# Patient Record
Sex: MALE | Race: OTHER
[De-identification: names, ages, dates, MRNs, and addresses within clinical notes are randomized per-mention and may not be internally consistent; named-entity substitution may affect disease eponyms.]

---

## 2017-07-27 ENCOUNTER — HOSPITAL ENCOUNTER (EMERGENCY)
Dept: HOSPITAL 61 - ER | Age: 32
Discharge: HOME | End: 2017-07-27
Payer: SELF-PAY

## 2017-07-27 VITALS — BODY MASS INDEX: 26.66 KG/M2 | WEIGHT: 160 LBS | HEIGHT: 65 IN

## 2017-07-27 VITALS — SYSTOLIC BLOOD PRESSURE: 141 MMHG | DIASTOLIC BLOOD PRESSURE: 88 MMHG

## 2017-07-27 DIAGNOSIS — N45.1: Primary | ICD-10-CM

## 2017-07-27 LAB
BACTERIA #/AREA URNS HPF: 0 /HPF
BILIRUB UR QL STRIP: NEGATIVE
GLUCOSE UR STRIP-MCNC: NEGATIVE MG/DL
NITRITE UR QL STRIP: NEGATIVE
PH UR STRIP: 7 [PH]
PROT UR STRIP-MCNC: NEGATIVE MG/DL
RBC #/AREA URNS HPF: (no result) /HPF (ref 0–2)
SP GR UR STRIP: 1.02
UROBILINOGEN UR-MCNC: 1 MG/DL
WBC #/AREA URNS HPF: (no result) /HPF (ref 0–4)

## 2017-07-27 PROCEDURE — 76870 US EXAM SCROTUM: CPT

## 2017-07-27 PROCEDURE — 96372 THER/PROPH/DIAG INJ SC/IM: CPT

## 2017-07-27 PROCEDURE — 87086 URINE CULTURE/COLONY COUNT: CPT

## 2017-07-27 PROCEDURE — 81001 URINALYSIS AUTO W/SCOPE: CPT

## 2017-07-27 PROCEDURE — 87591 N.GONORRHOEAE DNA AMP PROB: CPT

## 2017-07-27 PROCEDURE — 87491 CHLMYD TRACH DNA AMP PROBE: CPT

## 2017-07-27 PROCEDURE — 99285 EMERGENCY DEPT VISIT HI MDM: CPT

## 2017-07-27 NOTE — RAD
EXAM: Testicular sonogram.

 

HISTORY: Right-sided pain.

 

TECHNIQUE: Grayscale and color Doppler sonographic imaging of the testes 

with spectral waveform analysis was performed.

 

COMPARISON: None.

 

FINDINGS: The right testis measures 4.6 x 3.3 x 2.6 cm. The left testis 

measures 5.0 x 2.8 x 2.7 cm. There is normal symmetric blood flow within 

both testes. There is a heterogeneously enlarged and hyperemic right 

epididymis. There are small right greater than left hydroceles.

 

IMPRESSION:

1. Heterogeneously enlarged and hyperemic right epididymis likely due to 

epididymitis.

2. Sonographically unremarkable testes.

3. Small right greater than left hydroceles.

 

Electronically signed by: Kendra Rodriguez MD (7/27/2017 7:27 PM) [facility]

## 2017-12-21 ENCOUNTER — HOSPITAL ENCOUNTER (EMERGENCY)
Dept: HOSPITAL 61 - ER | Age: 32
Discharge: HOME | End: 2017-12-21
Payer: SELF-PAY

## 2017-12-21 VITALS — WEIGHT: 160 LBS | BODY MASS INDEX: 25.71 KG/M2 | HEIGHT: 66 IN

## 2017-12-21 VITALS — SYSTOLIC BLOOD PRESSURE: 127 MMHG | DIASTOLIC BLOOD PRESSURE: 75 MMHG

## 2017-12-21 DIAGNOSIS — N20.0: Primary | ICD-10-CM

## 2017-12-21 DIAGNOSIS — K46.9: ICD-10-CM

## 2017-12-21 LAB
ALBUMIN SERPL-MCNC: 4.1 G/DL (ref 3.4–5)
ALBUMIN/GLOB SERPL: 1.1 {RATIO} (ref 1–1.7)
ALP SERPL-CCNC: 92 U/L (ref 46–116)
ALT SERPL-CCNC: 29 U/L (ref 16–63)
ANION GAP SERPL CALC-SCNC: 10 MMOL/L (ref 6–14)
AST SERPL-CCNC: 23 U/L (ref 15–37)
BACTERIA #/AREA URNS HPF: (no result) /HPF
BASOPHILS # BLD AUTO: 0.1 X10^3/UL (ref 0–0.2)
BASOPHILS NFR BLD: 1 % (ref 0–3)
BILIRUB SERPL-MCNC: 0.3 MG/DL (ref 0.2–1)
BILIRUB UR QL STRIP: NEGATIVE
BUN SERPL-MCNC: 17 MG/DL (ref 8–26)
BUN/CREAT SERPL: 19 (ref 6–20)
CALCIUM SERPL-MCNC: 8.6 MG/DL (ref 8.5–10.1)
CHLORIDE SERPL-SCNC: 104 MMOL/L (ref 98–107)
CO2 SERPL-SCNC: 27 MMOL/L (ref 21–32)
CREAT SERPL-MCNC: 0.9 MG/DL (ref 0.7–1.3)
EOSINOPHIL NFR BLD: 1 % (ref 0–3)
ERYTHROCYTE [DISTWIDTH] IN BLOOD BY AUTOMATED COUNT: 13.6 % (ref 11.5–14.5)
GFR SERPLBLD BASED ON 1.73 SQ M-ARVRAT: 99.1 ML/MIN
GLOBULIN SER-MCNC: 3.6 G/DL (ref 2.2–3.8)
GLUCOSE SERPL-MCNC: 147 MG/DL (ref 70–99)
GLUCOSE UR STRIP-MCNC: NEGATIVE MG/DL
HCT VFR BLD CALC: 45 % (ref 39–53)
HGB BLD-MCNC: 15.1 G/DL (ref 13–17.5)
LYMPHOCYTES # BLD: 3.6 X10^3/UL (ref 1–4.8)
LYMPHOCYTES NFR BLD AUTO: 39 % (ref 24–48)
MCH RBC QN AUTO: 29 PG (ref 25–35)
MCHC RBC AUTO-ENTMCNC: 34 G/DL (ref 31–37)
MCV RBC AUTO: 85 FL (ref 79–100)
MONOCYTES NFR BLD: 6 % (ref 0–9)
NEUTROPHILS NFR BLD AUTO: 53 % (ref 31–73)
NITRITE UR QL STRIP: NEGATIVE
PH UR STRIP: 7 [PH]
PLATELET # BLD AUTO: 354 X10^3/UL (ref 140–400)
POTASSIUM SERPL-SCNC: 3.8 MMOL/L (ref 3.5–5.1)
PROT SERPL-MCNC: 7.7 G/DL (ref 6.4–8.2)
PROT UR STRIP-MCNC: NEGATIVE MG/DL
RBC # BLD AUTO: 5.27 X10^6/UL (ref 4.3–5.7)
RBC #/AREA URNS HPF: >40 /HPF (ref 0–2)
SODIUM SERPL-SCNC: 141 MMOL/L (ref 136–145)
SP GR UR STRIP: 1.02
SQUAMOUS #/AREA URNS LPF: (no result) /LPF
UROBILINOGEN UR-MCNC: 1 MG/DL
WBC # BLD AUTO: 9.2 X10^3/UL (ref 4–11)
WBC #/AREA URNS HPF: (no result) /HPF (ref 0–4)

## 2017-12-21 PROCEDURE — 83690 ASSAY OF LIPASE: CPT

## 2017-12-21 PROCEDURE — 96361 HYDRATE IV INFUSION ADD-ON: CPT

## 2017-12-21 PROCEDURE — 36415 COLL VENOUS BLD VENIPUNCTURE: CPT

## 2017-12-21 PROCEDURE — 96375 TX/PRO/DX INJ NEW DRUG ADDON: CPT

## 2017-12-21 PROCEDURE — 74177 CT ABD & PELVIS W/CONTRAST: CPT

## 2017-12-21 PROCEDURE — 96374 THER/PROPH/DIAG INJ IV PUSH: CPT

## 2017-12-21 PROCEDURE — 81001 URINALYSIS AUTO W/SCOPE: CPT

## 2017-12-21 PROCEDURE — 85025 COMPLETE CBC W/AUTO DIFF WBC: CPT

## 2017-12-21 PROCEDURE — 80053 COMPREHEN METABOLIC PANEL: CPT

## 2017-12-21 PROCEDURE — 99285 EMERGENCY DEPT VISIT HI MDM: CPT

## 2017-12-21 RX ADMIN — HYDROMORPHONE HYDROCHLORIDE PRN MG: 2 INJECTION INTRAMUSCULAR; INTRAVENOUS; SUBCUTANEOUS at 08:59

## 2017-12-21 RX ADMIN — HYDROMORPHONE HYDROCHLORIDE PRN MG: 2 INJECTION INTRAMUSCULAR; INTRAVENOUS; SUBCUTANEOUS at 09:21

## 2017-12-21 NOTE — RAD
Indication: Abdominal pain.



Axial imaging through the abdomen and pelvis was performed after the

administration of intravenous contrast.



No prior studies are available for comparison.



The lung bases are clear. No discrete liver mass is identified. The

gallbladder is unremarkable. The pancreas and spleen are unremarkable. No

adrenal mass is identified. No renal calculi are seen. There does appear to be

moderate right-sided hydroureteronephrosis. There is a questionable tiny

distal right ureteric calculus near the UVJ versus a phlebolith. The left

ureter is unremarkable. The small and large bowel loops are normal caliber.

There is no ascites. Bladder is unremarkable. There is a right inguinal hernia

containing fluid.



Impression:

1. Right-sided hydroureteronephrosis. There is questionable tiny distal right

ureteric calculus versus a phlebolith. Findings may be secondary to recent

passage of a calculus with residual edema at the UVJ.

2. Right inguinal hernia.

## 2017-12-21 NOTE — PHYS DOC
Past Medical History


Past Medical History:  No Pertinent History


Past Surgical History:  No Surgical History


Alcohol Use:  Occasionally


Drug Use:  None





Adult General


Chief Complaint


Chief Complaint:  ABDOMINAL PAIN





HPI


HPI





32-year-old male presenting to the emergency department today with right lower 

quadrant abdominal pain/flank pain. It is a sharp shooting pain that radiates 

into the groin. It is severe. He denies any fevers or chills. He has nausea 

without any vomiting. He denies having any history of medical conditions.





Review of systems is negative for chest pain shortness of breath fevers chills 

vomiting diarrhea. All other review of systems is negative unless otherwise 

noted in history of present illness.





ED course: 32-year-old male presenting to the emergency department today with 

right-sided flank pain/right lower quadrant abdominal pain. On arrival he is 

afebrile with a normal heart rate. Physical exam shows right-sided flank pain. 

he is a soft nontender abdomen. Blood work and CT obtained. CBC unremarkable. 

Urinalysis shows blood in the urine. Chemistry panel unremarkable. The patient 

was given IV fluids nausea and pain medications in the emergency department. CT 

abdomen pelvis shows a hernia and a probably recently expelled stone versus 

tiny stone at the UVJ. On reexamination the patient he continues to have a soft 

nontender abdomen. Negative McBurney's point. His hernia is easily reducible 

and obviously not strangulated or incarcerated. His pain is not in his hernia 

site. He is feeling much better on examination. The patient was then discharged 

home in stable condition to follow up with their primary care physician over 

the next 2-3 days. They were to return if their symptoms worsened or if they 

were concerned for any reason. Face-to-face discharge instructions and return 

precautions were given. Patient's questions were answered to their 

satisfaction. Patient is comfortable plan.





Review of Systems


Review of Systems


SEE ABOVE.





Current Medications


Current Medications





Current Medications








 Medications


  (Trade)  Dose


 Ordered  Sig/Travis  Start Time


 Stop Time Status Last Admin


Dose Admin


 


 Hydromorphone HCl


  (Dilaudid)  0.5 mg  PRN Q15MIN  PRN  12/21/17 09:00


 12/22/17 08:59  12/21/17 09:21


0.5 MG


 


 Info


  (Do NOT chart on


 this entry -- for


 MONITORING)  1 each  PRN DAILY  PRN  12/21/17 10:00


 12/23/17 09:59   


 


 


 Iohexol


  (Omnipaque 300


 Mg/ml)  75 ml  1X  ONCE  12/21/17 09:45


 12/21/17 09:47 DC 12/21/17 10:28


75 ML


 


 Ondansetron HCl


  (Zofran)  4 mg  1X  ONCE  12/21/17 09:00


 12/21/17 09:01 DC 12/21/17 08:57


4 MG


 


 Sodium Chloride  1,000 ml @ 


 1,000 mls/hr  Q1H  12/21/17 08:48


 12/21/17 09:47 DC 12/21/17 08:56


1,000 MLS/HR











Allergies


Allergies





Allergies








Coded Allergies Type Severity Reaction Last Updated Verified


 


  No Known Drug Allergies    7/27/17 No











Physical Exam


Physical Exam


SEE ABOVE





Constitutional: Well developed, well nourished, no acute distress, non-toxic 

appearance. 


HENT: Normocephalic, atraumatic, bilateral external ears normal, oropharynx 

moist, no oral exudates, nose normal. []


Eyes: PERRLA, EOMI, conjunctiva normal, no discharge. [] 


Neck: Normal range of motion, no tenderness, supple, no stridor.  


Cardiovascular:Heart rate regular rhythm, no murmur []


Lungs & Thorax:  Bilateral breath sounds clear to auscultation []


Abdomen: SEE ABOVE


Skin: Warm, dry, no erythema, no rash.  


Back: right cva tenderness


Extremities: No tenderness, no cyanosis, no clubbing, ROM intact, no edema. [] 


Neurologic: Alert and oriented X 3, normal motor function, normal sensory 

function, no focal deficits noted. 


Psychologic: Affect normal, judgement normal, mood normal. []





Current Patient Data


Vital Signs





 Vital Signs








  Date Time  Temp Pulse Resp B/P (MAP) Pulse Ox O2 Delivery O2 Flow Rate FiO2


 


12/21/17 11:05  70 20 127/75 (92) 96   


 


12/21/17 08:59      Room Air  


 


12/21/17 08:53 97.5       





 97.5       








Lab Values





 Laboratory Tests








Test


  12/21/17


08:40 12/21/17


08:50


 


Urine Collection Type Unknown   


 


Urine Color Yellow   


 


Urine Clarity Clear   


 


Urine pH 7.0   


 


Urine Specific Gravity 1.025   


 


Urine Protein


  Negative mg/dL


(NEG-TRACE) 


 


 


Urine Glucose (UA)


  Negative mg/dL


(NEG) 


 


 


Urine Ketones (Stick)


  Negative mg/dL


(NEG) 


 


 


Urine Blood Large (NEG)   


 


Urine Nitrite


  Negative (NEG)


  


 


 


Urine Bilirubin


  Negative (NEG)


  


 


 


Urine Urobilinogen Dipstick


  1.0 mg/dL (0.2


mg/dL) 


 


 


Urine Leukocyte Esterase


  Negative (NEG)


  


 


 


Urine RBC


  >40 /HPF (0-2)


  


 


 


Urine WBC


  Occ /HPF (0-4)


  


 


 


Urine Squamous Epithelial


Cells Few /LPF  


  


 


 


Urine Bacteria


  Few /HPF


(0-FEW) 


 


 


Urine Hyaline Casts


  Occasional


/HPF 


 


 


Urine Mucus Mod /LPF   


 


White Blood Count


  


  9.2 x10^3/uL


(4.0-11.0)


 


Red Blood Count


  


  5.27 x10^6/uL


(4.30-5.70)


 


Hemoglobin


  


  15.1 g/dL


(13.0-17.5)


 


Hematocrit


  


  45.0 %


(39.0-53.0)


 


Mean Corpuscular Volume


  


  85 fL ()


 


 


Mean Corpuscular Hemoglobin  29 pg (25-35)  


 


Mean Corpuscular Hemoglobin


Concent 


  34 g/dL


(31-37)


 


Red Cell Distribution Width


  


  13.6 %


(11.5-14.5)


 


Platelet Count


  


  354 x10^3/uL


(140-400)


 


Neutrophils (%) (Auto)  53 % (31-73)  


 


Lymphocytes (%) (Auto)  39 % (24-48)  


 


Monocytes (%) (Auto)  6 % (0-9)  


 


Eosinophils (%) (Auto)  1 % (0-3)  


 


Basophils (%) (Auto)  1 % (0-3)  


 


Neutrophils # (Auto)


  


  4.8 x10^3uL


(1.8-7.7)


 


Lymphocytes # (Auto)


  


  3.6 x10^3/uL


(1.0-4.8)


 


Monocytes # (Auto)


  


  0.6 x10^3/uL


(0.0-1.1)


 


Eosinophils # (Auto)


  


  0.1 x10^3/uL


(0.0-0.7)


 


Basophils # (Auto)


  


  0.1 x10^3/uL


(0.0-0.2)


 


Sodium Level


  


  141 mmol/L


(136-145)


 


Potassium Level


  


  3.8 mmol/L


(3.5-5.1)


 


Chloride Level


  


  104 mmol/L


()


 


Carbon Dioxide Level


  


  27 mmol/L


(21-32)


 


Anion Gap  10 (6-14)  


 


Blood Urea Nitrogen


  


  17 mg/dL


(8-26)


 


Creatinine


  


  0.9 mg/dL


(0.7-1.3)


 


Estimated GFR


(Cockcroft-Gault) 


  99.1  


 


 


BUN/Creatinine Ratio  19 (6-20)  


 


Glucose Level


  


  147 mg/dL


(70-99)  H


 


Calcium Level


  


  8.6 mg/dL


(8.5-10.1)


 


Total Bilirubin


  


  0.3 mg/dL


(0.2-1.0)


 


Aspartate Amino Transferase


(AST) 


  23 U/L (15-37)


 


 


Alanine Aminotransferase (ALT)


  


  29 U/L (16-63)


 


 


Alkaline Phosphatase


  


  92 U/L


()


 


Total Protein


  


  7.7 g/dL


(6.4-8.2)


 


Albumin


  


  4.1 g/dL


(3.4-5.0)


 


Albumin/Globulin Ratio  1.1 (1.0-1.7)  


 


Lipase


  


  77 U/L


()





 Laboratory Tests


12/21/17 08:50








 Laboratory Tests


12/21/17 08:50














EKG


EKG


[]





Radiology/Procedures


Radiology/Procedures


[]





Course & Med Decision Making


Course & Med Decision Making


Pertinent Labs and Imaging studies reviewed. (See chart for details)





[]





Dragon Disclaimer


Dragon Disclaimer


This electronic medical record was generated, in whole or in part, using a 

voice recognition dictation system.





Departure


Departure


Impression:  


 Primary Impression:  


 Nephrolithiasis


 Additional Impression:  


 Hernia


Disposition:  01 HOME, SELF-CARE


Condition:  STABLE


Referrals:  


NO PCP (PCP)








AQUILINO YOUSIF MD


Patient Instructions:  Hernia, Easy-to-Read, Kidney Stones





Additional Instructions:  


Thank you for allowing us to participate in your care today.





Followup with your primary care physician in 3 days if your symptoms do not 

improve.  Call your Primary Doctor tomorrow and inform them of your visit 

today.  If you do not have a primary care provider you can ask for a list of 

our primary care providers. Return to the emergency department you have any new 

or concerning findings.





This should be evaluated by the primary care physician and any necessary 

consulting services for continued management within a few days after discharge. 

Return to emergency room if you have any  new or concerning symptoms including 

but not limited to fever, chills, nausea, vomiting, intractable pain, any new 

rashes, chest pain, shortness of air, uncontrolled bleeding, difficulty 

breathing, and/or vision loss.





You may have been prescribed medication that can change in your level of 

thinking and ability to operate machinery. These medications include 

hydrocodone and Ativan. Also, Benadryl has been known to do this as well. Be 

sure to check with your pharmacist and ask if the medications you've prescribed 

can affect your level of consciousness. I recommend not operating heavy 

machinery or driving while on medication such as these.


Scripts


Hydrocodone Bit/Acetaminophen (HYDROCODONE-APAP 5-325  **) 1 Each Tablet


1 TAB PO PRN Q8HRS Y for SEVERE PAIN, #8 TAB 0 Refills


   Prov: KASSIE MOFFETT MD         12/21/17





Problem Qualifiers











KASSIE MOFFETT MD Dec 21, 2017 11:27

## 2019-11-12 ENCOUNTER — HOSPITAL ENCOUNTER (EMERGENCY)
Dept: HOSPITAL 61 - ER | Age: 34
Discharge: HOME | End: 2019-11-12
Payer: SELF-PAY

## 2019-11-12 VITALS — WEIGHT: 155 LBS | HEIGHT: 64 IN | BODY MASS INDEX: 26.46 KG/M2

## 2019-11-12 VITALS — SYSTOLIC BLOOD PRESSURE: 129 MMHG | DIASTOLIC BLOOD PRESSURE: 64 MMHG

## 2019-11-12 DIAGNOSIS — N13.2: Primary | ICD-10-CM

## 2019-11-12 DIAGNOSIS — R10.31: ICD-10-CM

## 2019-11-12 DIAGNOSIS — Z87.442: ICD-10-CM

## 2019-11-12 DIAGNOSIS — R11.2: ICD-10-CM

## 2019-11-12 LAB
ALBUMIN SERPL-MCNC: 4.3 G/DL (ref 3.4–5)
ALBUMIN/GLOB SERPL: 1.2 {RATIO} (ref 1–1.7)
ALP SERPL-CCNC: 87 U/L (ref 46–116)
ALT SERPL-CCNC: 28 U/L (ref 16–63)
ANION GAP SERPL CALC-SCNC: 9 MMOL/L (ref 6–14)
APTT PPP: YELLOW S
AST SERPL-CCNC: 29 U/L (ref 15–37)
BACTERIA #/AREA URNS HPF: (no result) /HPF
BASOPHILS # BLD AUTO: 0 X10^3/UL (ref 0–0.2)
BASOPHILS NFR BLD: 0 % (ref 0–3)
BILIRUB SERPL-MCNC: 0.4 MG/DL (ref 0.2–1)
BILIRUB UR QL STRIP: NEGATIVE
BUN SERPL-MCNC: 14 MG/DL (ref 8–26)
BUN/CREAT SERPL: 16 (ref 6–20)
CALCIUM SERPL-MCNC: 9.1 MG/DL (ref 8.5–10.1)
CHLORIDE SERPL-SCNC: 105 MMOL/L (ref 98–107)
CO2 SERPL-SCNC: 25 MMOL/L (ref 21–32)
CREAT SERPL-MCNC: 0.9 MG/DL (ref 0.7–1.3)
EOSINOPHIL NFR BLD: 0 % (ref 0–3)
EOSINOPHIL NFR BLD: 0 X10^3/UL (ref 0–0.7)
ERYTHROCYTE [DISTWIDTH] IN BLOOD BY AUTOMATED COUNT: 13.8 % (ref 11.5–14.5)
FIBRINOGEN PPP-MCNC: CLEAR MG/DL
GFR SERPLBLD BASED ON 1.73 SQ M-ARVRAT: 97.8 ML/MIN
GLOBULIN SER-MCNC: 3.7 G/DL (ref 2.2–3.8)
GLUCOSE SERPL-MCNC: 123 MG/DL (ref 70–99)
HCT VFR BLD CALC: 45.9 % (ref 39–53)
HGB BLD-MCNC: 15.5 G/DL (ref 13–17.5)
LIPASE: 375 U/L (ref 73–393)
LYMPHOCYTES # BLD: 1.9 X10^3/UL (ref 1–4.8)
LYMPHOCYTES NFR BLD AUTO: 15 % (ref 24–48)
MCH RBC QN AUTO: 29 PG (ref 25–35)
MCHC RBC AUTO-ENTMCNC: 34 G/DL (ref 31–37)
MCV RBC AUTO: 87 FL (ref 79–100)
MONO #: 0.5 X10^3/UL (ref 0–1.1)
MONOCYTES NFR BLD: 4 % (ref 0–9)
NEUT #: 10 X10^3/UL (ref 1.8–7.7)
NEUTROPHILS NFR BLD AUTO: 81 % (ref 31–73)
NITRITE UR QL STRIP: NEGATIVE
PH UR STRIP: 5.5 [PH]
PLATELET # BLD AUTO: 280 X10^3/UL (ref 140–400)
POTASSIUM SERPL-SCNC: 4.3 MMOL/L (ref 3.5–5.1)
PROT SERPL-MCNC: 8 G/DL (ref 6.4–8.2)
PROT UR STRIP-MCNC: NEGATIVE MG/DL
RBC # BLD AUTO: 5.28 X10^6/UL (ref 4.3–5.7)
RBC #/AREA URNS HPF: (no result) /HPF (ref 0–2)
SODIUM SERPL-SCNC: 139 MMOL/L (ref 136–145)
UROBILINOGEN UR-MCNC: 0.2 MG/DL
WBC # BLD AUTO: 12.4 X10^3/UL (ref 4–11)
WBC #/AREA URNS HPF: (no result) /HPF (ref 0–4)

## 2019-11-12 PROCEDURE — 96361 HYDRATE IV INFUSION ADD-ON: CPT

## 2019-11-12 PROCEDURE — 99285 EMERGENCY DEPT VISIT HI MDM: CPT

## 2019-11-12 PROCEDURE — 36415 COLL VENOUS BLD VENIPUNCTURE: CPT

## 2019-11-12 PROCEDURE — 96375 TX/PRO/DX INJ NEW DRUG ADDON: CPT

## 2019-11-12 PROCEDURE — 83690 ASSAY OF LIPASE: CPT

## 2019-11-12 PROCEDURE — 83605 ASSAY OF LACTIC ACID: CPT

## 2019-11-12 PROCEDURE — 80053 COMPREHEN METABOLIC PANEL: CPT

## 2019-11-12 PROCEDURE — 96374 THER/PROPH/DIAG INJ IV PUSH: CPT

## 2019-11-12 PROCEDURE — 81001 URINALYSIS AUTO W/SCOPE: CPT

## 2019-11-12 PROCEDURE — 74177 CT ABD & PELVIS W/CONTRAST: CPT

## 2019-11-12 PROCEDURE — 85025 COMPLETE CBC W/AUTO DIFF WBC: CPT

## 2019-11-12 NOTE — PHYS DOC
Past Medical History


Past Medical History:  Kidney Stone


Past Surgical History:  No Surgical History


Additional Information:  


0.25 PPD


Alcohol Use:  Occasionally


Drug Use:  None





Adult General


Chief Complaint


Chief Complaint:  ABDOMINAL PAIN





HPI


HPI





Patient is a 34  year old male with history of kidney stones, hernias, who 

presents to the ED today complaining over 10 out of 10 sharp constant right low

er quadrant abdominal pain, radiating to the right flank region, with nausea 

vomiting that began 2 hours prior to coming to the ED. Patient denies any 

exacerbating or relieving factors. Denies any hematuria. Denies any fever.





Review of Systems


Review of Systems





Constitutional: Denies fever or chills []


Eyes: Denies change in visual acuity, redness, or eye pain []


HENT: Denies nasal congestion or sore throat []


Respiratory: Denies cough or shortness of breath []


Cardiovascular: No additional information not addressed in HPI []


GI: Reports right lower quadrant abdominal pain with nausea vomiting, denies 

bloody stools or diarrhea []


: Reports right flank pain. Denies dysuria or hematuria []


Musculoskeletal: Denies back pain or joint pain []


Integument: Denies rash or skin lesions []


Neurologic: Denies headache, focal weakness or sensory changes []








All other systems were reviewed and found to be within normal limits, except as 

documented in this note.





Current Medications


Current Medications





Current Medications








 Medications


  (Trade)  Dose


 Ordered  Sig/Travis  Start Time


 Stop Time Status Last Admin


Dose Admin


 


 Info


  (CONTRAST GIVEN


 -- Rx MONITORING)  1 each  PRN DAILY  PRN  11/12/19 10:45


 11/14/19 10:44   





 


 Iohexol


  (Omnipaque 300


 Mg/ml)  75 ml  1X  ONCE  11/12/19 10:30


 11/12/19 10:31 DC 11/12/19 10:50


75 ML


 


 Morphine Sulfate


  (Morphine


 Sulfate)  5 mg  1X  ONCE  11/12/19 10:30


 11/12/19 10:31 DC 11/12/19 10:33


5 MG


 


 Ondansetron HCl


  (Zofran)  4 mg  1X  ONCE  11/12/19 10:30


 11/12/19 10:31 DC 11/12/19 10:32


4 MG


 


 Sodium Chloride  1,000 ml @ 


 1,000 mls/hr  1X  ONCE  11/12/19 10:30


 11/12/19 11:29 DC 11/12/19 10:32


1,000 MLS/HR











Allergies


Allergies





Allergies








Coded Allergies Type Severity Reaction Last Updated Verified


 


  No Known Drug Allergies    7/27/17 No











Physical Exam


Physical Exam





Constitutional: Well developed, well nourished, no acute distress, non-toxic 

appearance. []


HENT: Normocephalic, atraumatic, bilateral external ears normal, oropharynx 

moist, no oral exudates, nose normal. []


Eyes: PERRLA, EOMI, conjunctiva normal, no discharge. [] 


Neck: Normal range of motion, no tenderness, supple, no stridor. [] 


Cardiovascular:Heart rate regular rhythm, no murmur []


Lungs & Thorax:  Bilateral breath sounds clear to auscultation []


Abdomen: Bowel sounds normal, soft, no right upper quadrant tenderness, slight 

tenderness on palpation of the right lower quadrant with negative psoas sign, 

negative obturator sign, negative Rovsing sign, no masses, no pulsatile masses. 

[] 


Skin: Warm, dry, no erythema, no rash. [] 


Back: No tenderness, slight right CVA tenderness. [] 


Extremities: No tenderness, no cyanosis, no clubbing, ROM intact, no edema. [] 


Neurologic: Alert and oriented X 3, normal motor function, normal sensory 

function, no focal deficits noted. []


Psychologic: Affect normal, judgement normal, mood normal. []





Current Patient Data


Vital Signs





                                   Vital Signs








  Date Time  Temp Pulse Resp B/P (MAP) Pulse Ox O2 Delivery O2 Flow Rate FiO2


 


11/12/19 11:03   19  96 Room Air  


 


11/12/19 09:59 97.7 55  176/82 (113)    





 97.7       








Lab Values





                                Laboratory Tests








Test


 11/12/19


10:15 11/12/19


10:32


 


White Blood Count


 12.4 x10^3/uL


(4.0-11.0)  H 





 


Red Blood Count


 5.28 x10^6/uL


(4.30-5.70) 





 


Hemoglobin


 15.5 g/dL


(13.0-17.5) 





 


Hematocrit


 45.9 %


(39.0-53.0) 





 


Mean Corpuscular Volume


 87 fL ()


 





 


Mean Corpuscular Hemoglobin 29 pg (25-35)   


 


Mean Corpuscular Hemoglobin


Concent 34 g/dL


(31-37) 





 


Red Cell Distribution Width


 13.8 %


(11.5-14.5) 





 


Platelet Count


 280 x10^3/uL


(140-400) 





 


Neutrophils (%) (Auto) 81 % (31-73)  H 


 


Lymphocytes (%) (Auto) 15 % (24-48)  L 


 


Monocytes (%) (Auto) 4 % (0-9)   


 


Eosinophils (%) (Auto) 0 % (0-3)   


 


Basophils (%) (Auto) 0 % (0-3)   


 


Neutrophils # (Auto)


 10.0 x10^3/uL


(1.8-7.7)  H 





 


Lymphocytes # (Auto)


 1.9 x10^3/uL


(1.0-4.8) 





 


Monocytes # (Auto)


 0.5 x10^3/uL


(0.0-1.1) 





 


Eosinophils # (Auto)


 0.0 x10^3/uL


(0.0-0.7) 





 


Basophils # (Auto)


 0.0 x10^3/uL


(0.0-0.2) 





 


Sodium Level


 139 mmol/L


(136-145) 





 


Potassium Level


 4.3 mmol/L


(3.5-5.1) 





 


Chloride Level


 105 mmol/L


() 





 


Carbon Dioxide Level


 25 mmol/L


(21-32) 





 


Anion Gap 9 (6-14)   


 


Blood Urea Nitrogen


 14 mg/dL


(8-26) 





 


Creatinine


 0.9 mg/dL


(0.7-1.3) 





 


Estimated GFR


(Cockcroft-Gault) 97.8  


 





 


BUN/Creatinine Ratio 16 (6-20)   


 


Glucose Level


 123 mg/dL


(70-99)  H 





 


Lactic Acid Level


 1.1 mmol/L


(0.4-2.0) 





 


Calcium Level


 9.1 mg/dL


(8.5-10.1) 





 


Total Bilirubin


 0.4 mg/dL


(0.2-1.0) 





 


Aspartate Amino Transferase


(AST) 29 U/L (15-37)


 





 


Alanine Aminotransferase (ALT)


 28 U/L (16-63)


 





 


Alkaline Phosphatase


 87 U/L


() 





 


Total Protein


 8.0 g/dL


(6.4-8.2) 





 


Albumin


 4.3 g/dL


(3.4-5.0) 





 


Albumin/Globulin Ratio 1.2 (1.0-1.7)   


 


Lipase


 375 U/L


() 





 


Urine Collection Type  Void  


 


Urine Color  Yellow  


 


Urine Clarity  Clear  


 


Urine pH  5.5  


 


Urine Specific Gravity  1.025  


 


Urine Protein


 


 Negative mg/dL


(NEG-TRACE)


 


Urine Glucose (UA)


 


 Negative mg/dL


(NEG)


 


Urine Ketones (Stick)


 


 Negative mg/dL


(NEG)


 


Urine Blood  Large (NEG)  


 


Urine Nitrite


 


 Negative (NEG)





 


Urine Bilirubin


 


 Negative (NEG)





 


Urine Urobilinogen Dipstick


 


 0.2 mg/dL (0.2


mg/dL)


 


Urine Leukocyte Esterase


 


 Negative (NEG)





 


Urine RBC


 


 Tntc /HPF


(0-2)


 


Urine WBC


 


 Occ /HPF (0-4)





 


Urine Bacteria


 


 Few /HPF


(0-FEW)


 


Urine Mucus  Marked /LPF  





                                Laboratory Tests


11/12/19 10:15








                                Laboratory Tests


11/12/19 10:15














EKG


EKG


[]





Radiology/Procedures


Radiology/Procedures


[]PROCEDURE: CT ABD PELV W/ IV CONTRST ONLY





Examination: CT ABD PELV W/ IV CONTRST ONLY


 


History: Right lower quadrant pain


 


Comparison/Correlation: 12/21/2017 CT abdomen and pelvis with IV contrast


 


Findings: Axial images of the abdomen and pelvis were obtained following 


IV contrast. Sagittal and coronal reformatted images were provided.


 


Small sliding hiatal hernia is present.


 


Liver, spleen, pancreas, adrenal glands, and left kidney are normal.


 


Mild right hydronephrosis and hydroureter is due to a 0.3 cm diameter 


partially obstructive right ureterovesical junction calculus.


 


Gallbladder fossa is normal. No bowel obstruction or extraluminal gas. 


Appendix is normal.


 


Well-corticated right L5 pars intraarticularis fracture without 


malalignment. Sclerotic density involving the spinous process of L1 is 


noted likely representing a bone island.


 


Small right inguinal hernia contains omental fat.


 


 


Impression:


Small sliding hiatal hernia.


 


Mild right hydronephrosis and hydroureter due to a less than 0.3 cm 


partially obstructing calculus at the ureterovesical junction.


 


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


 


 


PQRS Compliance Statement:


 


One or more of the following individualized dose reduction techniques were


utilized for this examination:  


1. Automated exposure control  


2. Adjustment of the mA and/or kV according to patient size  


3. Use of iterative reconstruction technique


 


Electronically signed by: Hiral Perez MD (11/12/2019 11:05 AM) [facility]














DICTATED and SIGNED BY:     HIRAL PEREZ MD


DATE:     11/12/19 6030





Course & Med Decision Making


Course & Med Decision Making


Pertinent Labs and Imaging studies reviewed. (See chart for details)





This is a 34-year-old male patient presenting to the ED today with complaints of

 right lower quadrant abdominal pain radiating to the flank that began 2 hours 

ago. 





Urine positive for large amount of blood, no nitrates or leukocytes. CBC with a 

WBC of 12.4. CMP- no acute findings.





CT of the abdomen and pelvic with IV contrast noted for small sliding hiatal 

hernia. Mild right hydronephrosis and hydroureter due to a less than 0.3 cm 


partially obstructing calculus at the ureterovesical junction. Right L5 pars 

interarticularis fracture is chronic in appearance.





Patient was given pain medicine in the ED, IV fluids and Flomax. Feeling better.

 Discharged to home. Follow-up with nephrologist. Prescription for pain med

icine, nausea medicine and Flomax provided





Dragon Disclaimer


Dragon Disclaimer


This electronic medical record was generated, in whole or in part, using a voice

 recognition dictation system.





Departure


Departure


Impression:  


   Primary Impression:  


   Kidney stone on right side


Disposition:  01 HOME, SELF-CARE


Condition:  STABLE


Referrals:  


NO PCP (PCP)


Follow-up with your primary care doctor as well as a urologist of your choice


Patient Instructions:  Kidney Stones, Easy-to-Read





Additional Instructions:  


You were evaluated in the emergency room and noted to have a 3 mm kidney stone. 

This is small enough and should hopefully pass. Take the prescribed medications 

as ordered. Follow-up with a urologist or your choice. We do not have any 

urologist at [facility].





Your prescriptions were sent to your pharmacy.


Scripts


Ondansetron Hcl (ZOFRAN) 4 Mg Tablet


1 TAB PO Q6HRS, #20 TAB


   Prov: TERI SAUCEDA         11/12/19 


Tamsulosin Hcl (FLOMAX) 0.4 Mg Cap.er.24h


1 CAP PO DAILY, #6 CAP 0 Refills


   Prov: TERI SAUCEDA         11/12/19











TERI SAUCEDA              Nov 12, 2019 11:49

## 2019-11-12 NOTE — RAD
Examination: CT ABD PELV W/ IV CONTRST ONLY

 

History: Right lower quadrant pain

 

Comparison/Correlation: 12/21/2017 CT abdomen and pelvis with IV contrast

 

Findings: Axial images of the abdomen and pelvis were obtained following 

IV contrast. Sagittal and coronal reformatted images were provided.

 

Small sliding hiatal hernia is present.

 

Liver, spleen, pancreas, adrenal glands, and left kidney are normal.

 

Mild right hydronephrosis and hydroureter is due to a 0.3 cm diameter 

partially obstructive right ureterovesical junction calculus.

 

Gallbladder fossa is normal. No bowel obstruction or extraluminal gas. 

Appendix is normal.

 

Well-corticated right L5 pars intraarticularis fracture without 

malalignment. Sclerotic density involving the spinous process of L1 is 

noted likely representing a bone island.

 

Small right inguinal hernia contains omental fat.

 

 

Impression:

Small sliding hiatal hernia.

 

Mild right hydronephrosis and hydroureter due to a less than 0.3 cm 

partially obstructing calculus at the ureterovesical junction.

 

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

 

 

PQRS Compliance Statement:

 

One or more of the following individualized dose reduction techniques were

utilized for this examination:  

1. Automated exposure control  

2. Adjustment of the mA and/or kV according to patient size  

3. Use of iterative reconstruction technique

 

Electronically signed by: Guanaco Ricci MD (11/12/2019 11:05 AM) [facility]